# Patient Record
Sex: MALE | Race: WHITE | NOT HISPANIC OR LATINO | URBAN - METROPOLITAN AREA
[De-identification: names, ages, dates, MRNs, and addresses within clinical notes are randomized per-mention and may not be internally consistent; named-entity substitution may affect disease eponyms.]

---

## 2021-02-25 ENCOUNTER — APPOINTMENT (RX ONLY)
Dept: URBAN - METROPOLITAN AREA CLINIC 167 | Facility: CLINIC | Age: 26
Setting detail: DERMATOLOGY
End: 2021-02-25

## 2021-02-25 DIAGNOSIS — K11.6 MUCOCELE OF SALIVARY GLAND: ICD-10-CM

## 2021-02-25 PROBLEM — D48.5 NEOPLASM OF UNCERTAIN BEHAVIOR OF SKIN: Status: ACTIVE | Noted: 2021-02-25

## 2021-02-25 PROBLEM — D23.39 OTHER BENIGN NEOPLASM OF SKIN OF OTHER PARTS OF FACE: Status: ACTIVE | Noted: 2021-02-25

## 2021-02-25 PROCEDURE — ? BIOPSY BY SHAVE METHOD

## 2021-02-25 PROCEDURE — ? FULL BODY SKIN EXAM - DECLINED

## 2021-02-25 PROCEDURE — 99202 OFFICE O/P NEW SF 15 MIN: CPT | Mod: 25

## 2021-02-25 PROCEDURE — 11102 TANGNTL BX SKIN SINGLE LES: CPT

## 2021-02-25 PROCEDURE — ? COUNSELING

## 2021-02-25 ASSESSMENT — LOCATION ZONE DERM: LOCATION ZONE: LIP

## 2021-02-25 ASSESSMENT — LOCATION SIMPLE DESCRIPTION DERM: LOCATION SIMPLE: RIGHT INFERIOR MUCOSAL LIP

## 2021-02-25 ASSESSMENT — LOCATION DETAILED DESCRIPTION DERM: LOCATION DETAILED: RIGHT INFERIOR MUCOSAL LIP

## 2021-02-25 NOTE — HPI: SKIN LESIONS
How Severe Is Your Skin Lesion?: moderate
Have Your Skin Lesions Been Treated?: not been treated
Is This A New Presentation, Or A Follow-Up?: Growth
Additional History: Another spot of concern is on top of the nose, not present at the moment, its been on and off for a few month, it get inflamed and it’s goes flat again.